# Patient Record
Sex: FEMALE | Race: WHITE | NOT HISPANIC OR LATINO | URBAN - METROPOLITAN AREA
[De-identification: names, ages, dates, MRNs, and addresses within clinical notes are randomized per-mention and may not be internally consistent; named-entity substitution may affect disease eponyms.]

---

## 2018-01-14 ENCOUNTER — COMMUNICATION - RIVER FALLS (OUTPATIENT)
Dept: FAMILY MEDICINE | Facility: CLINIC | Age: 28
End: 2018-01-14

## 2018-01-14 ENCOUNTER — OFFICE VISIT - RIVER FALLS (OUTPATIENT)
Dept: FAMILY MEDICINE | Facility: CLINIC | Age: 28
End: 2018-01-14

## 2018-01-14 ASSESSMENT — MIFFLIN-ST. JEOR: SCORE: 1671.16

## 2022-02-11 VITALS
BODY MASS INDEX: 38.55 KG/M2 | DIASTOLIC BLOOD PRESSURE: 70 MMHG | SYSTOLIC BLOOD PRESSURE: 110 MMHG | WEIGHT: 217.6 LBS | HEART RATE: 113 BPM | TEMPERATURE: 100.7 F | OXYGEN SATURATION: 98 % | HEIGHT: 63 IN

## 2022-02-16 NOTE — PROGRESS NOTES
Patient:   PABLO VALDIVIA            MRN: [id number]            FIN: [id number]               Age:   27 years     Sex:  Female     :  1990   Associated Diagnoses:   Fever; Influenza A; Possible pregnancy, not yet confirmed   Author:   Mine Leiva      Visit Information      Primary Care Provider (PCP):  Bhupendra Gamble MD# [id number]      Chief Complaint   2018 1:11 PM CST    Pt c/o fever, cough, body aches, fatigue, SOB since Friday night      History of Present Illness   PPC with flu like symptoms for less than 48 hours.  did not receive a flu vaccine this year, also concerned she may be pregnant  no lunderlying chronic illnesses  headache, myalgia, cough, fever      Review of Systems   Constitutional:  Fever, Fatigue.    Eye:  Negative.    Ear/Nose/Mouth/Throat:  Ear pain, Nasal congestion, Sore throat, mild sore throat and ear fullness.    Respiratory:  Cough.    Cardiovascular:  Negative.    Gastrointestinal:  Negative.    Genitourinary:  Negative except as documented in history of present illness.    Hematology/Lymphatics:  Negative.    Immunologic:  Negative.    Musculoskeletal:  Joint pain, Muscle pain.    Integumentary:  Negative.    Neurologic:  Negative.    Psychiatric:  Negative.             Health Status   Allergies:    Allergic Reactions (Selected)  No known allergies      Histories   Family History:    No family history items have been selected or recorded.      Physical Examination   Vital Signs   2018 1:11 PM CST Temperature Tympanic 100.7 DegF  HI    Peripheral Pulse Rate 113 bpm  HI    Pulse Site Radial artery    HR Method Manual    Systolic Blood Pressure 110 mmHg    Diastolic Blood Pressure 70 mmHg    Mean Arterial Pressure 83 mmHg    BP Site Right arm    BP Method Manual    Oxygen Saturation 98 %      Measurements from flowsheet : Measurements   2018 1:11 PM CST Height Measured - Standard 63 in    Weight Measured - Standard 217.6 lb    BSA 2.09 m2    Body  Mass Index 38.54 kg/m2  HI      General:  Alert and oriented, No acute distress.    Eye:  Pupils are equal, round and reactive to light.    HENT:  Normocephalic.         Head: normocephalic.         Ear: Both ears, Middle ear, Tympanic membrane ( Fluid in middle ear, bilaterally ).         Nose: Both nostrils, erythematous, clear discharge.         Sinus: Bilateral, Frontal sinus, Maxillary sinus, Within normal limits.         Mouth: Within normal limits.         Throat: Pharynx ( Erythematous, moderate amount clear post nasal discharge ).         Glands: Bilateral.    Neck:  Supple.    Respiratory:       Breath sounds: Bilateral, Posterior, Lower lobe, Base, Inspiratory wheezes, Expiratory wheezes.    Cardiovascular:  Normal rate, Regular rhythm.    Gastrointestinal:  Soft, Non-tender.    Integumentary:  Warm, Dry, Pink.    Neurologic:  Alert, Oriented, Normal sensory.    Psychiatric:  Cooperative, Appropriate mood & affect.       Review / Management   Results review:  negative pregnancy test  positive influenza A  negative influenza B.       Impression and Plan   Diagnosis     Fever (ELA75-IG R50.9).     Influenza A (RFV59-QC J10.1).     Possible pregnancy, not yet confirmed (TDE05-ED Z32.00).     Patient Instructions:       Counseled: Patient, Regarding diagnosis, Regarding treatment, Regarding medications, Verbalized understanding.    Summary:  push fluids, watch for symptoms of OM, sinusitis or pneumonia as week progresses  pg test negative but this may be too early.    Orders     Orders (Selected)   Prescriptions  Prescribed  Tamiflu 75 mg oral capsule: 1 cap(s) ( 75 mg ), PO, BID, # 10 cap(s), 0 Refill(s), Type: Maintenance, Pharmacy: [id number], 1 cap(s) po bid,x5 day(s).